# Patient Record
Sex: FEMALE | Race: WHITE | NOT HISPANIC OR LATINO | Employment: PART TIME | URBAN - METROPOLITAN AREA
[De-identification: names, ages, dates, MRNs, and addresses within clinical notes are randomized per-mention and may not be internally consistent; named-entity substitution may affect disease eponyms.]

---

## 2017-01-26 ENCOUNTER — ANESTHESIA EVENT (OUTPATIENT)
Dept: PERIOP | Facility: HOSPITAL | Age: 42
End: 2017-01-26
Payer: SELF-PAY

## 2017-01-27 ENCOUNTER — ANESTHESIA (OUTPATIENT)
Dept: PERIOP | Facility: HOSPITAL | Age: 42
End: 2017-01-27
Payer: SELF-PAY

## 2017-01-27 ENCOUNTER — HOSPITAL ENCOUNTER (OUTPATIENT)
Facility: HOSPITAL | Age: 42
Setting detail: OUTPATIENT SURGERY
Discharge: HOME | End: 2017-01-27
Attending: PLASTIC SURGERY | Admitting: PLASTIC SURGERY
Payer: SELF-PAY

## 2017-01-27 VITALS
HEIGHT: 65 IN | SYSTOLIC BLOOD PRESSURE: 105 MMHG | BODY MASS INDEX: 19.16 KG/M2 | OXYGEN SATURATION: 97 % | RESPIRATION RATE: 16 BRPM | DIASTOLIC BLOOD PRESSURE: 68 MMHG | HEART RATE: 53 BPM | WEIGHT: 115 LBS | TEMPERATURE: 98.6 F

## 2017-01-27 PROCEDURE — C1789 PROSTHESIS, BREAST, IMP: HCPCS | Performed by: PLASTIC SURGERY

## 2017-01-27 DEVICE — SMOOTH ROUND MODERATE PLUS PROFILE GEL-FILLED BREAST IMPLANT, 300CC  SMOOTH ROUND SILICONE
Type: IMPLANTABLE DEVICE | Site: BREAST | Status: FUNCTIONAL
Brand: MENTOR MEMORYGEL BREAST IMPLANT

## 2017-01-27 RX ORDER — SCOLOPAMINE TRANSDERMAL SYSTEM 1 MG/1
PATCH, EXTENDED RELEASE TRANSDERMAL AS NEEDED
Status: DISCONTINUED | OUTPATIENT
Start: 2017-01-27 | End: 2017-01-27 | Stop reason: SURG

## 2017-01-27 RX ORDER — LIDOCAINE HYDROCHLORIDE 10 MG/ML
INJECTION, SOLUTION INFILTRATION; PERINEURAL AS NEEDED
Status: DISCONTINUED | OUTPATIENT
Start: 2017-01-27 | End: 2017-01-27 | Stop reason: SURG

## 2017-01-27 RX ORDER — SODIUM CHLORIDE 9 MG/ML
125 INJECTION, SOLUTION INTRAVENOUS CONTINUOUS
Status: DISCONTINUED | OUTPATIENT
Start: 2017-01-27 | End: 2017-01-27 | Stop reason: HOSPADM

## 2017-01-27 RX ORDER — PROPOFOL 10 MG/ML
INJECTION, EMULSION INTRAVENOUS AS NEEDED
Status: DISCONTINUED | OUTPATIENT
Start: 2017-01-27 | End: 2017-01-27 | Stop reason: SURG

## 2017-01-27 RX ORDER — FENTANYL CITRATE 50 UG/ML
INJECTION, SOLUTION INTRAMUSCULAR; INTRAVENOUS AS NEEDED
Status: DISCONTINUED | OUTPATIENT
Start: 2017-01-27 | End: 2017-01-27 | Stop reason: SURG

## 2017-01-27 RX ORDER — MAGNESIUM HYDROXIDE 1200 MG/15ML
LIQUID ORAL AS NEEDED
Status: DISCONTINUED | OUTPATIENT
Start: 2017-01-27 | End: 2017-01-27 | Stop reason: HOSPADM

## 2017-01-27 RX ORDER — PROPOFOL 10 MG/ML
INJECTION, EMULSION INTRAVENOUS CONTINUOUS PRN
Status: DISCONTINUED | OUTPATIENT
Start: 2017-01-27 | End: 2017-01-27 | Stop reason: SURG

## 2017-01-27 RX ORDER — OXYCODONE HYDROCHLORIDE AND ACETAMINOPHEN 5; 325 MG/1; MG/1
2 TABLET ORAL EVERY 4 HOURS PRN
Status: DISCONTINUED | OUTPATIENT
Start: 2017-01-27 | End: 2017-01-27 | Stop reason: HOSPADM

## 2017-01-27 RX ORDER — FENTANYL CITRATE/PF 50 MCG/ML
50 SYRINGE (ML) INJECTION
Status: DISCONTINUED | OUTPATIENT
Start: 2017-01-27 | End: 2017-01-27 | Stop reason: HOSPADM

## 2017-01-27 RX ORDER — MIDAZOLAM HYDROCHLORIDE 1 MG/ML
INJECTION INTRAMUSCULAR; INTRAVENOUS AS NEEDED
Status: DISCONTINUED | OUTPATIENT
Start: 2017-01-27 | End: 2017-01-27 | Stop reason: SURG

## 2017-01-27 RX ORDER — ONDANSETRON 2 MG/ML
INJECTION INTRAMUSCULAR; INTRAVENOUS AS NEEDED
Status: DISCONTINUED | OUTPATIENT
Start: 2017-01-27 | End: 2017-01-27 | Stop reason: SURG

## 2017-01-27 RX ORDER — GLYCOPYRROLATE 0.2 MG/ML
INJECTION INTRAMUSCULAR; INTRAVENOUS AS NEEDED
Status: DISCONTINUED | OUTPATIENT
Start: 2017-01-27 | End: 2017-01-27 | Stop reason: SURG

## 2017-01-27 RX ORDER — ONDANSETRON 2 MG/ML
4 INJECTION INTRAMUSCULAR; INTRAVENOUS EVERY 8 HOURS PRN
Status: DISCONTINUED | OUTPATIENT
Start: 2017-01-27 | End: 2017-01-27 | Stop reason: HOSPADM

## 2017-01-27 RX ORDER — MEPERIDINE HYDROCHLORIDE 50 MG/ML
12.5 INJECTION INTRAMUSCULAR; INTRAVENOUS; SUBCUTANEOUS AS NEEDED
Status: DISCONTINUED | OUTPATIENT
Start: 2017-01-27 | End: 2017-01-27 | Stop reason: HOSPADM

## 2017-01-27 RX ORDER — ROCURONIUM BROMIDE 10 MG/ML
INJECTION, SOLUTION INTRAVENOUS AS NEEDED
Status: DISCONTINUED | OUTPATIENT
Start: 2017-01-27 | End: 2017-01-27 | Stop reason: SURG

## 2017-01-27 RX ORDER — BUPIVACAINE HYDROCHLORIDE AND EPINEPHRINE 2.5; 5 MG/ML; UG/ML
INJECTION, SOLUTION EPIDURAL; INFILTRATION; INTRACAUDAL; PERINEURAL AS NEEDED
Status: DISCONTINUED | OUTPATIENT
Start: 2017-01-27 | End: 2017-01-27 | Stop reason: HOSPADM

## 2017-01-27 RX ADMIN — ROCURONIUM BROMIDE 40 MG: 10 INJECTION, SOLUTION INTRAVENOUS at 08:43

## 2017-01-27 RX ADMIN — SODIUM CHLORIDE 125 ML/HR: 0.9 INJECTION, SOLUTION INTRAVENOUS at 08:00

## 2017-01-27 RX ADMIN — SODIUM CHLORIDE 125 ML/HR: 0.9 INJECTION, SOLUTION INTRAVENOUS at 11:39

## 2017-01-27 RX ADMIN — ONDANSETRON HYDROCHLORIDE 4 MG: 2 INJECTION, SOLUTION INTRAVENOUS at 08:36

## 2017-01-27 RX ADMIN — NEOSTIGMINE METHYLSULFATE 3 MG: 1 INJECTION INTRAMUSCULAR; INTRAVENOUS; SUBCUTANEOUS at 10:12

## 2017-01-27 RX ADMIN — PROPOFOL 100 MCG/KG/MIN: 10 INJECTION, EMULSION INTRAVENOUS at 08:46

## 2017-01-27 RX ADMIN — MIDAZOLAM HYDROCHLORIDE 2 MG: 1 INJECTION, SOLUTION INTRAMUSCULAR; INTRAVENOUS at 08:36

## 2017-01-27 RX ADMIN — HYDROMORPHONE HYDROCHLORIDE 0.5 MG: 1 INJECTION, SOLUTION INTRAMUSCULAR; INTRAVENOUS; SUBCUTANEOUS at 11:19

## 2017-01-27 RX ADMIN — REMIFENTANIL HYDROCHLORIDE 0.15 MCG/KG/MIN: 1 INJECTION, POWDER, LYOPHILIZED, FOR SOLUTION INTRAVENOUS at 08:46

## 2017-01-27 RX ADMIN — SODIUM CHLORIDE 125 ML/HR: 0.9 INJECTION, SOLUTION INTRAVENOUS at 14:00

## 2017-01-27 RX ADMIN — LIDOCAINE HYDROCHLORIDE 80 MG: 10 INJECTION, SOLUTION INFILTRATION; PERINEURAL at 08:43

## 2017-01-27 RX ADMIN — PROPOFOL 200 MG: 10 INJECTION, EMULSION INTRAVENOUS at 08:43

## 2017-01-27 RX ADMIN — CEFAZOLIN SODIUM 1000 MG: 1 SOLUTION INTRAVENOUS at 08:50

## 2017-01-27 RX ADMIN — FENTANYL CITRATE 50 MCG: 50 INJECTION, SOLUTION INTRAMUSCULAR; INTRAVENOUS at 09:03

## 2017-01-27 RX ADMIN — SCOPALAMINE 1 PATCH: 1 PATCH, EXTENDED RELEASE TRANSDERMAL at 08:34

## 2017-01-27 RX ADMIN — SODIUM CHLORIDE: 0.9 INJECTION, SOLUTION INTRAVENOUS at 09:03

## 2017-01-27 RX ADMIN — ROCURONIUM BROMIDE 10 MG: 10 INJECTION, SOLUTION INTRAVENOUS at 09:41

## 2017-01-27 RX ADMIN — GLYCOPYRROLATE 0.6 MG: 0.2 INJECTION, SOLUTION INTRAMUSCULAR; INTRAVENOUS at 10:12

## 2017-01-27 RX ADMIN — DEXAMETHASONE SODIUM PHOSPHATE 10 MG: 10 INJECTION INTRAMUSCULAR; INTRAVENOUS at 08:53

## 2017-01-27 RX ADMIN — FENTANYL CITRATE 50 MCG: 50 INJECTION, SOLUTION INTRAMUSCULAR; INTRAVENOUS at 08:42

## 2017-01-27 RX ADMIN — FENTANYL CITRATE 50 MCG: 50 INJECTION INTRAMUSCULAR; INTRAVENOUS at 10:34

## 2017-01-27 RX ADMIN — ROCURONIUM BROMIDE 10 MG: 10 INJECTION, SOLUTION INTRAVENOUS at 09:19

## 2017-01-27 RX ADMIN — ONDANSETRON 4 MG: 2 INJECTION INTRAMUSCULAR; INTRAVENOUS at 14:03

## 2017-01-27 RX ADMIN — OXYCODONE HYDROCHLORIDE AND ACETAMINOPHEN 1 TABLET: 5; 325 TABLET ORAL at 12:34

## 2017-01-27 RX ADMIN — HYDROMORPHONE HYDROCHLORIDE 0.5 MG: 1 INJECTION, SOLUTION INTRAMUSCULAR; INTRAVENOUS; SUBCUTANEOUS at 11:34

## 2017-01-27 RX ADMIN — FENTANYL CITRATE 50 MCG: 50 INJECTION INTRAMUSCULAR; INTRAVENOUS at 10:46

## 2017-01-27 RX ADMIN — ONDANSETRON HYDROCHLORIDE 4 MG: 2 INJECTION, SOLUTION INTRAVENOUS at 10:18

## 2017-08-07 ENCOUNTER — GENERIC CONVERSION - ENCOUNTER (OUTPATIENT)
Dept: OTHER | Facility: OTHER | Age: 42
End: 2017-08-07

## 2017-08-20 ENCOUNTER — OFFICE VISIT (OUTPATIENT)
Dept: URGENT CARE | Facility: MEDICAL CENTER | Age: 42
End: 2017-08-20
Payer: COMMERCIAL

## 2017-08-20 PROCEDURE — 99203 OFFICE O/P NEW LOW 30 MIN: CPT

## 2017-11-07 ENCOUNTER — ALLSCRIPTS OFFICE VISIT (OUTPATIENT)
Dept: OTHER | Facility: OTHER | Age: 42
End: 2017-11-07

## 2017-11-07 DIAGNOSIS — Z12.31 ENCOUNTER FOR SCREENING MAMMOGRAM FOR MALIGNANT NEOPLASM OF BREAST: ICD-10-CM

## 2017-11-08 NOTE — PROGRESS NOTES
Assessment  1  Dense breasts (793 82) (R92 2)   2  Irregular menstrual cycle (626 4) (N92 6)   3  Visit for screening mammogram (V76 12) (Z12 31)   4  Encounter for routine gynecological examination (V72 31) (Z01 419)    Plan  Encounter for routine gynecological examination    · Follow-up visit in 1 year Evaluation and Treatment  Follow-up  Status: Hold For -  Scheduling  Requested for: 65CHP2206   Ordered; For: Encounter for routine gynecological examination; Ordered By: Saunders Boeck Performed:  Due: 18NUC8408  Visit for screening mammogram    · MAMMO SCREENING BILATERAL W 3D & CAD; Status:Hold For - Scheduling; Requested  YJL:[id number];    Perform:[facility]; DRW:09HHL7800; Ordered; For:Visit for screening mammogram; Ordered By:Sami Keller; Discussion/Summary    1  Yearly exam-Pap smear deferred, self-breast awareness reviewed, calcium/vitamin-D recommendations discussed, mammogram request givenIrregular menses-patient with mostly monthly menses with occasional blood flow at 2-3 week intervals  She did have evaluation including sonohysterogram and endometrial biopsy December 2016 with negative findings  She will call with any persistent or worsening menometrorrhagia  Prior history of submucous myoma-patient was status post hysteroscopic myomectomy with perforation of the uterus with damage to the bladder  This was repaired laparoscopically with Dr Yves Castillo in July 2008  She denies any complaints from this  No recurrence was noted on sono histogram todayHistory of cervical dysplasia-Pap with HPV testing was negative November 2016  Pap smear was deferred today as per current guidelines with patient agreementHistory of right inguinal hernia-the patient is status post evaluation by Dr Marisela Werner  Surgery was never done  The patient could consider returning to see Dr Marisela Werner for evaluation of this condition and possible treatment should she continue with right-sided abdominal/pelvic pain  History of right pelvic painâresolved  She will call with any further symptomsIncreased breast densityânoted on most recent mammogram January 2016  Patient had ABUS done with negative findings    3D mammogram was recommended and patient agrees  Request was givenOther-patient status post breast enlargement procedure without problems  Good healing is noted  will follow-up in 1 year or as needed  The patient has the current Goals: Reviewed  The patent has the current Barriers: None  Patient is able to Self-Care  PATIENT EDUCATION RECORD She was given the following educational materials: Calcium/vitamin-D sheet   Chief Complaint  1  Visit For: Preventive General Multisystem Exam    History of Present Illness  HPI: Patient was seen today for yearly exam  Please see assessment plan for details  Review of Systems    Constitutional: No fever, no chills, feels well, no tiredness, no recent weight gain or loss  ENT: no ear ache, no loss of hearing, no nosebleeds or nasal discharge, no sore throat or hoarseness  Cardiovascular: no complaints of slow or fast heart rate, no chest pain, no palpitations, no leg claudication or lower extremity edema  Respiratory: no complaints of shortness of breath, no wheezing, no dyspnea on exertion, no orthopnea or PND  Breasts: no complaints of breast pain, breast lump or nipple discharge  Gastrointestinal: no complaints of abdominal pain, no constipation, no nausea or diarrhea, no vomiting, no bloody stools  Genitourinary: unexplained vaginal bleeding, but-- as noted in HPI  Musculoskeletal: no complaints of arthralgia, no myalgia, no joint swelling or stiffness, no limb pain or swelling  Integumentary: no complaints of skin rash or lesion, no itching or dry skin, no skin wounds  Neurological: no complaints of headache, no confusion, no numbness or tingling, no dizziness or fainting  ROS reviewed  Active Problems  1   Breast self examination education, encounter for (V65 49) (Z71 89)   2  Cervical cancer screening (V76 2) (Z12 4)   3  Dense breasts (793 82) (R92 2)   4  Encounter for routine gynecological examination with Papanicolaou smear of cervix   (V72 31,V76 2) (Z01 419)   5  Irregular menstrual cycle (626 4) (N92 6)   6  Lower back pain (724 2) (M54 5)   7  Screening for HPV (human papillomavirus) (V73 81) (Z11 51)   8  Strain of lumbar region, initial encounter (847 2) (S39 012A)   9  Visit for screening mammogram (V76 12) (Z12 31)    Past Medical History   · Breast self examination education, encounter for (V65 49) (Z71 89)   · History of Closed Fracture Of L5 Vertebral Body (805 4)   · History of Endometrial polyp (621 0) (N84 0)   · History of mastitis (V13 89) (I42 490)   · History of shortness of breath (V13 89) (W64 958)   · History of uterine leiomyoma (V13 29) (Z86 018)   · History of Inguinal hernia (550 90) (K40 90)   · History of Knee Pain Elicited By Motion   · History of Mild cervical dysplasia (622 11) (N87 0)   · History of Missed  (632) (O02 1)   · History of Multiple abrasions (919 0) (T07  Darline Rule)   · History of Pelvic and perineal pain (625 9) (R10 2)   · History of Summary Of Previous Pregnancies  3  (Total No )   · History of Summary Of Previous Pregnancies Para 2  (Deliveries)    The active problems and past medical history were reviewed and updated today  Surgical History   · History of Biopsy Breast Open   · History of Breast Surgery Enlargement Procedure   · History of  Section Low Transverse   · History of Colposcopy Cervix With Biopsy(S) With Endocervical Curettage   · History of Dilation And Curettage   · History of Hysteroscopy With Removal Of Submucous Leiomyomata   · History of Laparoscopy Myomectomy   · History of Surgically Induced    · History of Tubal Ligation    The surgical history was reviewed and updated today         Family History  Mother    · No pertinent family history  Father    · Family history of cardiac disorder (V17 49) (Z82 49)  Maternal Grandmother    · Family history of Colon Cancer (V16 0)   · Family history of Heart Disease (V17 49)   · Family history of Hypertension (V17 49)  Paternal Grandmother    · Family history of Heart Disease (V17 49)  Maternal Grandfather    · Family history of Lung Cancer (V16 1)  Paternal Grandfather    · Family history of Heart Disease (V17 49)    The family history was reviewed and updated today  Social History   · Being A Social Drinker   · Caffeine Use   · Denied: History of Drug Use   · Marital History - Currently    · Never A Smoker   · Taoism Affiliation Daksha Salguero Vary 17)   · Tubal ligations (V26 51) (Z98 51)   · Two children   · Uses Safety Equipment - Seatbelts  The social history was reviewed and updated today  The social history was reviewed and is unchanged  Current Meds   1  Multi-Vitamin Daily Oral Tablet; Therapy: (Recorded:27Umv9257) to Recorded    Allergies  1  Sulfa Drugs   2  Keflex CAPS    Vitals   Recorded: 54EXQ0182 00:80YB   Systolic 067, LUE, Sitting   Diastolic 68, LUE, Sitting   Height 5 ft 5 in   Weight 114 lb 6 oz   BMI Calculated 19 03   BSA Calculated 1 56   LMP 19Oct2017     Physical Exam    Constitutional   General appearance: No acute distress, well appearing and well nourished  Neck   Neck: Normal, supple, trachea midline, no masses  Thyroid: Normal, no thyromegaly  Genitourinary   External genitalia: Normal and no lesions appreciated  Vagina: Normal, no lesions or dryness appreciated  Urethra: Normal     Urethral meatus: Normal     Bladder: Normal, soft, non-tender and no prolapse or masses appreciated  Cervix: Normal, no palpable masses  -- Anteverted, top normal size, nontender, without masses  Adnexa/parametria: Normal, non-tender and no fullness or masses appreciated  Anus, perineum, and rectum: Normal sphincter tone, no masses, and no prolapse      Chest   Breasts: Normal and no dimpling or skin changes noted  -- Symmetric breast implants noted  Abdomen   Abdomen: Normal, non-tender, and no organomegaly noted  Liver and spleen: No hepatomegaly or splenomegaly  Examination for hernias: No hernias appreciated  Lymphatic   Palpation of lymph nodes in neck, axillae, groin and/or other locations: No lymphadenopathy or masses noted  Skin   Skin and subcutaneous tissue: Normal skin turgor and no rashes      Palpation of skin and subcutaneous tissue: Normal     Psychiatric   Orientation to person, place, and time: Normal     Mood and affect: Normal        Signatures   Electronically signed by : CLARENCE Dumont ; Nov 7 2017  2:11PM EST                       (Author)

## 2018-01-02 ENCOUNTER — HOSPITAL ENCOUNTER (OUTPATIENT)
Dept: MAMMOGRAPHY | Facility: MEDICAL CENTER | Age: 43
Discharge: HOME | End: 2018-01-02
Payer: COMMERCIAL

## 2018-01-02 DIAGNOSIS — Z12.31 ENCOUNTER FOR SCREENING MAMMOGRAM FOR MALIGNANT NEOPLASM OF BREAST: ICD-10-CM

## 2018-01-02 PROCEDURE — 77063 BREAST TOMOSYNTHESIS BI: CPT

## 2018-01-02 PROCEDURE — 77067 SCR MAMMO BI INCL CAD: CPT

## 2018-01-03 ENCOUNTER — GENERIC CONVERSION - ENCOUNTER (OUTPATIENT)
Dept: OTHER | Facility: OTHER | Age: 43
End: 2018-01-03

## 2018-01-03 DIAGNOSIS — R92.2 INCONCLUSIVE MAMMOGRAM: ICD-10-CM

## 2018-01-12 NOTE — MISCELLANEOUS
Message   Recorded as Task   Date: 12/13/2016 08:25 AM, Created By: Eugene Fox   Task Name: Go to Result   Assigned To: Lillian Myrick   Regarding Patient: Astrid Delcid, Status: In Progress   Comment:    Ace Keller - 13 Dec 2016 8:25 AM     TASK CREATED  Endometrial biopsy is benign, please inform the patient  Recommend she call with continued menometrorrhagia  Otherwise, follow up with yearly exams   Anupama Cortez - 13 Dec 2016 9:16 AM     TASK IN PROGRESS   Pt informed  Active Problems    1  Breast self examination education, encounter for (V65 49) (Z71 89)   2  Cervical cancer screening (V76 2) (Z12 4)   3  Dense breasts (793 82) (R92 2)   4  Encounter for routine gynecological examination with Papanicolaou smear of cervix   (V72 31,V76 2) (Z01 419)   5  Irregular menstrual cycle (626 4) (N92 6)   6  Pelvic and perineal pain (625 9) (R10 2)   7  Screening for HPV (human papillomavirus) (V73 81) (Z11 51)   8  Visit for screening mammogram (V76 12) (Z12 31)    Current Meds   1  Multi-Vitamin Daily Oral Tablet; Therapy: (Recorded:01Ajw9574) to Recorded    Allergies    1   Sulfa Drugs    Signatures   Electronically signed by : Kimberlyn Dunne, ; Dec 13 2016  9:32AM EST                       (Author)

## 2018-01-13 NOTE — MISCELLANEOUS
Message   Date: 30 Mar 2016 10:12 AM EST, Recorded By: Tima Velarde For: Jamir Mccray: Anand Ulloa, Self   Phone: [phone] (Home), [phone] (Work)   Reason: Other   ABUS precerted-- no precert needed per Mima Mccoy at [facility] 150    sent regional breast the authorization           Active Problems    1  Abdominal pain, RLQ (right lower quadrant) (789 03) (R10 31)   2  Acute upper respiratory infection (465 9) (J06 9)   3  Breast cancer screening (V76 10) (Z12 39)   4  Breast self examination education, encounter for (V65 49) (Z71 89)   5  Dense breasts (793 82) (R92 2)   6  Encounter for screening for malignant neoplasm of cervix (V76 2) (Z12 4)   7  Hand Pain Elicited By Motion   8  Irregular menstrual cycle (626 4) (N92 6)   9  Knee Pain Elicited By Motion   10  Laboratory examination ordered as part of a routine general medical examination    (V72 62) (Z00 00)   11  Leiomyoma of uterus (218 9) (D25 9)   12  Multiple abrasions (919 0) (T14 8)   13  Pelvic and perineal pain (625 9) (R10 2)   14  Routine Gynecological Exam With Cervical Pap Smear (V72 31)   15  Shortness of breath (786 05) (R06 02)   16  Submucous leiomyoma of uterus (218 0) (D25 0)    Current Meds   1  Azithromycin 250 MG Oral Tablet; TAKE 2 TABLETS ON DAY 1 THEN TAKE 1 TABLET A   DAY FOR 4 DAYS; Therapy: 05ZJK8725 to (Last Rx:32Ihl3576) Ordered   2  Multi-Vitamin Daily Oral Tablet; Therapy: (Recorded:59Mst0827) to Recorded    Allergies    1   Sulfa Drugs    Signatures   Electronically signed by : Shanti Jones, ; Mar 30 2016 10:13AM EST                       (Author)

## 2018-01-14 VITALS
WEIGHT: 114.38 LBS | HEIGHT: 65 IN | SYSTOLIC BLOOD PRESSURE: 114 MMHG | DIASTOLIC BLOOD PRESSURE: 68 MMHG | BODY MASS INDEX: 19.06 KG/M2

## 2018-01-15 NOTE — MISCELLANEOUS
Assessment    1  Abscess or cellulitis of leg (682 6) (L02 419,L03 119)   2  Contact dermatitis (692 9) (L25 9)    Plan  Contact dermatitis    · Follow Up if Not Better Evaluation and Treatment  Follow-up  Status: Complete  Done:  31AZW6780 02:45PM   Ordered; For: Contact dermatitis; Ordered By: Janelle Acosta Performed:  Due: 20YMH2645    Discussion/Summary  Discussion Summary:   Patient will complete prednisone as well as anabiotic as directed  Patient will follow-up as needed  Chief Complaint  Chief Complaint Free Text Note Form: GERALD APPT  Patient was admitted to [facility] 7/25/16 for Contact Dermatitis due to plan, Cellulitis of L thigh and skin  She was discharged to home 7/26/16  She notes that the Cellulitis is still spreading and the poison is very itchy  History of Present Illness  TCM Communication [facility]: The patient is being contacted for follow-up after hospitalization and GERALD APPT 07/27/2016 AT 02:15 PM  Hospital course was discussed with the inpatient physician, records were reviewed and  [address]  She was hospitalized at [facility]  The date of discharge: 07/26/2016  Diagnosis: ADMITTING DIAGNOSIS: CONTACT DERMATITIS DUE TO PLANT, CELLULITIS OF LEFT THIGH & CELLULITIS OF SKIN  She was discharged to home, DISCHARGE DIAGNOSIS: CONTACT DERMATITIS SECONDARY TO POISON [place] 49  Medications reviewed and updated today  She scheduled a follow up appointment     Symptoms: dizziness, rash:, swelling and swelling location LEFT LEG UPPER THIGH , but no fever, no weakness, no headache, no fatigue, no cough, no shortness of breath, no chest pain, no back pain on left side, no back pain on right side, no arm pain left side, no arm pain on right side, no leg pain on left side, no leg pain on right side, no upper abdominal pain, no middle abdominal pain, no lower abdominal pain, no anorexia, no nausea, no vomiting, no loose stools, no constipation and no pain with urinating  PT FEELS HER DIZZINESS IS FROM ALL THE MEDS SHE IS ON  Counseling was provided to the patient  Topics counseled included diagnostic results, instructions for management, activities of daily living and importance of compliance with treatment  Communication performed and completed by Doc Durham   HPI: Patient is here status post hospitalization from July 25 through July 26 for cellulitis left lower extremity  Patient had fever and redness and pain which has resolved  Patient also with contact dermatitis secondary to poison ivy  Patient on prednisone taper along with Keflex and doxycycline  Patient had normal CBC except with blood cell count 11 29  Review of Systems  Complete-Female:   Constitutional: No fever, no chills, feels well, no tiredness, no recent weight gain or weight loss  Eyes: No complaints of eye pain, no red eyes, no eyesight problems, no discharge, no dry eyes, no itching of eyes  ENT: no complaints of earache, no loss of hearing, no nose bleeds, no nasal discharge, no sore throat, no hoarseness  Cardiovascular: No complaints of slow heart rate, no fast heart rate, no chest pain, no palpitations, no leg claudication, no lower extremity edema  Respiratory: No complaints of shortness of breath, no wheezing, no cough, no SOB on exertion, no orthopnea, no PND  Gastrointestinal: No complaints of abdominal pain, no constipation, no nausea or vomiting, no diarrhea, no bloody stools  Genitourinary: No complaints of dysuria, no incontinence, no pelvic pain, no dysmenorrhea, no vaginal discharge or bleeding  Musculoskeletal: No complaints of arthralgias, no myalgias, no joint swelling or stiffness, no limb pain or swelling  Integumentary: as noted in HPI  Neurological: No complaints of headache, no confusion, no convulsions, no numbness, no dizziness or fainting, no tingling, no limb weakness, no difficulty walking     Psychiatric: Not suicidal, no sleep disturbance, no anxiety or depression, no change in personality, no emotional problems  Endocrine: No complaints of proptosis, no hot flashes, no muscle weakness, no deepening of the voice, no feelings of weakness  Hematologic/Lymphatic: No complaints of swollen glands, no swollen glands in the neck, does not bleed easily, does not bruise easily  Active Problems    1  Abdominal pain, RLQ (right lower quadrant) (789 03) (R10 31)   2  Acute upper respiratory infection (465 9) (J06 9)   3  Breast cancer screening (V76 10) (Z12 39)   4  Breast self examination education, encounter for (V65 49) (Z71 89)   5  Dense breasts (793 82) (R92 2)   6  Encounter for screening for malignant neoplasm of cervix (V76 2) (Z12 4)   7  Hand Pain Elicited By Motion   8  Irregular menstrual cycle (626 4) (N92 6)   9  Knee Pain Elicited By Motion   10  Laboratory examination ordered as part of a routine general medical examination    (V72 62) (Z00 00)   11  Leiomyoma of uterus (218 9) (D25 9)   12  Multiple abrasions (919 0) (T14 8)   13  Pelvic and perineal pain (625 9) (R10 2)   14  Routine Gynecological Exam With Cervical Pap Smear (V72 31)   15  Shortness of breath (786 05) (R06 02)   16  Submucous leiomyoma of uterus (218 0) (D25 0)    Past Medical History    1  Breast self examination education, encounter for (V65 49) (Z71 89)   2  History of Closed Fracture Of L5 Vertebral Body (805 4)   3  History of Endometrial polyp (621 0) (N84 0)   4  History of mastitis (V13 89) (Z87 898)   5  History of Inguinal hernia (550 90) (K40 90)   6  History of Mild cervical dysplasia (622 11) (N87 0)   7  History of Missed  (632) (O02 1)   8  History of Summary Of Previous Pregnancies  3  (Total No )   9  History of Summary Of Previous Pregnancies Para 2  (Deliveries)    Surgical History    1  History of Biopsy Breast Open   2  History of  Section Low Transverse   3   History of Colposcopy Cervix With Biopsy(S) With Endocervical Curettage   4  History of Dilation And Curettage   5  History of Hysteroscopy With Removal Of Submucous Leiomyomata   6  History of Laparoscopy Myomectomy   7  History of Surgically Induced    8  History of Tubal Ligation    Family History  Mother    1  No pertinent family history  Father    2  Family history of cardiac disorder (V17 49) (Z82 49)  Maternal Grandmother    3  Family history of Colon Cancer (V16 0)   4  Family history of Heart Disease (V17 49)   5  Family history of Hypertension (V17 49)  Paternal Grandmother    10  Family history of Heart Disease (V17 49)  Maternal Grandfather    7  Family history of Lung Cancer (V16 1)  Paternal Grandfather    6  Family history of Heart Disease (V17 49)    Social History    · Being A Social Drinker   · Caffeine Use   · Denied: History of Drug Use   · Marital History - Currently    · Never A Smoker   · Bahai Affiliation Daksha Salguero Vary 17)   · Tubal ligations (V26 51) (Z98 51)   · Two children   · Uses Safety Equipment - Seatbelts    Current Meds   1  Azithromycin 250 MG Oral Tablet; TAKE 2 TABLETS ON DAY 1 THEN TAKE 1 TABLET A   DAY FOR 4 DAYS; Therapy: 64DEY8688 to (Last Rx:79Clx3836) Ordered   2  Multi-Vitamin Daily Oral Tablet; Therapy: (Recorded:53Pim7023) to Recorded    Allergies    1  Sulfa Drugs    Vitals  Signs   Recorded: 38Kwa9512 02:32PM   Height: 5 ft 5 in  Weight: 113 lb 6 oz  BMI Calculated: 18 87  BSA Calculated: 1 55    Physical Exam    Constitutional   General appearance: No acute distress, well appearing and well nourished  Eyes   Conjunctiva and lids: No swelling, erythema or discharge  Pupils and irises: Equal, round and reactive to light  Ears, Nose, Mouth, and Throat   External inspection of ears and nose: Normal     Otoscopic examination: Tympanic membranes translucent with normal light reflex  Canals patent without erythema      Nasal mucosa, septum, and turbinates: Normal without edema or erythema  Oropharynx: Normal with no erythema, edema, exudate or lesions  Pulmonary   Respiratory effort: No increased work of breathing or signs of respiratory distress  Auscultation of lungs: Clear to auscultation  Cardiovascular   Palpation of heart: Normal PMI, no thrills  Auscultation of heart: Normal rate and rhythm, normal S1 and S2, without murmurs  Examination of extremities for edema and/or varicosities: Normal     Carotid pulses: Normal     Abdomen   Abdomen: Non-tender, no masses  Liver and spleen: No hepatomegaly or splenomegaly  Lymphatic   Palpation of lymph nodes in neck: No lymphadenopathy  Musculoskeletal   Gait and station: Normal     Digits and nails: Normal without clubbing or cyanosis  Inspection/palpation of joints, bones, and muscles: Normal     Skin   Skin and subcutaneous tissue: Abnormal   Contact dermatitis consistent with poison on chin/abdomen/extremities  Warmth and mild erythema left thigh  Neurologic   Cranial nerves: Cranial nerves 2-12 intact  Reflexes: 2+ and symmetric  Sensation: No sensory loss      Psychiatric   Orientation to person, place, and time: Normal     Mood and affect: Normal          Signatures   Electronically signed by : Renee Scherer, ; Jul 27 2016 10:01AM EST                       (Co-author)    Electronically signed by : Joy Rachel DO; Jul 27 2016  2:44PM EST                       (Author)

## 2018-01-16 NOTE — MISCELLANEOUS
Message   Recorded as Task   Date: 01/18/2016 10:20 AM, Created By: Shannen Hill   Task Name: Go to Result   Assigned To: Manny Fragoso   Regarding Patient: Eric Villatoro, Status: In Progress   Comment:    Marcellus Baltazar - 18 Jan 2016 10:20 AM     TASK CREATED  pt needs ABUS due toe treme breat density   Anupama Cortez - 19 Jan 2016 1:49 PM     TASK IN PROGRESS    Pt informed, slip and letter mailed  Active Problems    1  Abdominal pain, RLQ (right lower quadrant) (789 03) (R10 31)   2  Breast cancer screening (V76 10) (Z12 39)   3  Breast self examination education, encounter for (V65 49) (Z71 89)   4  Dense breasts (793 82) (R92 2)   5  Encounter for screening for malignant neoplasm of cervix (V76 2) (Z12 4)   6  Hand Pain Elicited By Motion   7  Irregular menstrual cycle (626 4) (N92 6)   8  Knee Pain Elicited By Motion   9  Laboratory examination ordered as part of a routine general medical examination   (V72 62) (Z00 00)   10  Leiomyoma of uterus (218 9) (D25 9)   11  Multiple abrasions (919 0) (T14 8)   12  Pelvic and perineal pain (625 9) (R10 2)   13  Routine Gynecological Exam With Cervical Pap Smear (V72 31)   14  Shortness of breath (786 05) (R06 02)   15  Submucous leiomyoma of uterus (218 0) (D25 0)    Current Meds   1  Multi-Vitamin Daily Oral Tablet; Therapy: (Recorded:90Xzz7246) to Recorded    Allergies    1  Sulfa Drugs    Plan  Dense breasts    · US BREAST BILATERAL ABUS; Status:Hold For - Scheduling,Retrospective  Authorization;  Requested AER:[id number];     Signatures   Electronically signed by : Ermias Perdomo, ; Jan 19 2016  1:51PM EST                       (Author)

## 2018-01-23 NOTE — MISCELLANEOUS
Message   Recorded as Task   Date: 01/02/2018 07:11 PM, Created By: Grazyna Mccray   Task Name: Miscellaneous   Assigned To: Fariba Lennon   Regarding Patient: Jayesh Christian, Status: Active   CommentStejamal Lundberg - 02 Jan 2018 7:11 PM     TASK CREATED  Mammogram within normal limits with increased breast density noted  Could consider automated breast ultrasound soon and/or 3D mammogram in 1 year's time  Lashay Mackenzie - 03 Jan 2018 8:08 AM     TASK EDITED  letter and script mailed to pt        Active Problems    1  Breast self examination education, encounter for (V65 49) (Z71 89)   2  Cervical cancer screening (V76 2) (Z12 4)   3  Dense breasts (793 82) (R92 2)   4  Encounter for routine gynecological examination (V72 31) (Z01 419)   5  Encounter for routine gynecological examination with Papanicolaou smear of cervix   (V72 31,V76 2) (Z01 419)   6  Irregular menstrual cycle (626 4) (N92 6)   7  Lower back pain (724 2) (M54 5)   8  Screening for HPV (human papillomavirus) (V73 81) (Z11 51)   9  Strain of lumbar region, initial encounter (847 2) (S39 012A)   10  Visit for screening mammogram (V76 12) (Z12 31)    Current Meds   1  Multi-Vitamin Daily Oral Tablet; Therapy: (Recorded:26Amc0526) to Recorded    Allergies    1  Sulfa Drugs   2  Keflex CAPS    Plan  Dense breasts    · US BREAST SCREENING BILATERAL COMPLETE (ABUS); Status:Hold For -  Prepared Response;  Requested VOR:[id number];     Signatures   Electronically signed by : Lea Sommer, ; Pedro Luis  3 2018  8:08AM EST                       (Author)

## 2018-01-26 ENCOUNTER — OFFICE VISIT (OUTPATIENT)
Dept: FAMILY MEDICINE CLINIC | Facility: CLINIC | Age: 43
End: 2018-01-26
Payer: COMMERCIAL

## 2018-01-26 VITALS
WEIGHT: 116.2 LBS | DIASTOLIC BLOOD PRESSURE: 72 MMHG | SYSTOLIC BLOOD PRESSURE: 120 MMHG | TEMPERATURE: 98.2 F | BODY MASS INDEX: 19.84 KG/M2 | HEIGHT: 64 IN

## 2018-01-26 DIAGNOSIS — Z23 NEED FOR INFLUENZA VACCINATION: Primary | ICD-10-CM

## 2018-01-26 DIAGNOSIS — F41.9 ANXIETY: ICD-10-CM

## 2018-01-26 DIAGNOSIS — Z23 NEED FOR TDAP VACCINATION: ICD-10-CM

## 2018-01-26 PROCEDURE — 99213 OFFICE O/P EST LOW 20 MIN: CPT | Performed by: FAMILY MEDICINE

## 2018-01-26 RX ORDER — ALPRAZOLAM 0.25 MG/1
0.25 TABLET ORAL 2 TIMES DAILY PRN
Qty: 30 TABLET | Refills: 0 | Status: SHIPPED | OUTPATIENT
Start: 2018-01-26

## 2018-01-26 NOTE — PROGRESS NOTES
Assessment/Plan:    No problem-specific Assessment & Plan notes found for this encounter  patient will try to continue with exercise, relaxation techniques, meditation  patient will use Xanax as needed for panic attacks  Other guidance given  Diagnoses and all orders for this visit:    Need for influenza vaccination  -     Flu vaccine greater than or equal to 4yo preservative free IM    Need for Tdap vaccination  -     Tdap vaccine greater than or equal to 6yo IM    Anxiety  -     ALPRAZolam (XANAX) 0 25 mg tablet; Take 1 tablet (0 25 mg total) by mouth 2 (two) times a day as needed for anxiety          Subjective:      Patient ID: Solomon Hollis is a 36 y o  female  Patient is here under increased stress over the past year  Patient worsening overall  Patient starting with panic attacks over the past week  Patient feels that her throat tightness and she can't breathe with increased stress and anxiety  The patient also with some chest discomfort associated with this  Patient stresses include going through divorce, work  Patient also with decreased focus and concentration  no homicidal or suicidal ideation  Anxiety   Symptoms include decreased concentration and nervous/anxious behavior  Patient reports no confusion or suicidal ideas  The following portions of the patient's history were reviewed and updated as appropriate: allergies, current medications, past family history, past medical history, past social history, past surgical history and problem list     Review of Systems   Constitutional: Negative  HENT: Negative  Eyes: Negative  Respiratory: Negative  Cardiovascular: Negative  Gastrointestinal: Negative  Endocrine: Negative  Genitourinary: Negative  Musculoskeletal: Negative  Skin: Negative  Allergic/Immunologic: Negative  Neurological: Negative  Hematological: Negative      Psychiatric/Behavioral: Positive for decreased concentration and sleep disturbance  Negative for confusion, hallucinations, self-injury and suicidal ideas  The patient is nervous/anxious  Objective:     Physical Exam   Constitutional: She is oriented to person, place, and time  She appears well-developed and well-nourished  No distress  HENT:   Head: Normocephalic  Right Ear: External ear normal    Left Ear: External ear normal    Mouth/Throat: Oropharynx is clear and moist  No oropharyngeal exudate  Eyes: EOM are normal  Pupils are equal, round, and reactive to light  Right eye exhibits no discharge  Left eye exhibits no discharge  No scleral icterus  Neck: Normal range of motion  Neck supple  No thyromegaly present  Cardiovascular: Normal rate, regular rhythm, normal heart sounds and intact distal pulses  Exam reveals no gallop and no friction rub  No murmur heard  Pulmonary/Chest: Effort normal and breath sounds normal  No respiratory distress  She has no wheezes  She has no rales  She exhibits no tenderness  Abdominal: Soft  Bowel sounds are normal  She exhibits no distension  There is no tenderness  There is no rebound and no guarding  Musculoskeletal: Normal range of motion  She exhibits no edema or tenderness  Lymphadenopathy:     She has no cervical adenopathy  Neurological: She is oriented to person, place, and time  No cranial nerve deficit  She exhibits normal muscle tone  Coordination normal    Skin: Skin is warm and dry  No rash noted  She is not diaphoretic  No erythema  No pallor     Psychiatric: Her behavior is normal  Judgment and thought content normal      Anxiety noted regarding mood

## 2018-01-31 ENCOUNTER — TELEPHONE (OUTPATIENT)
Dept: OTHER | Facility: OTHER | Age: 43
End: 2018-01-31

## 2018-02-27 ENCOUNTER — OFFICE VISIT (OUTPATIENT)
Dept: FAMILY MEDICINE CLINIC | Facility: CLINIC | Age: 43
End: 2018-02-27
Payer: COMMERCIAL

## 2018-02-27 VITALS
TEMPERATURE: 99.5 F | SYSTOLIC BLOOD PRESSURE: 110 MMHG | WEIGHT: 115 LBS | BODY MASS INDEX: 19.16 KG/M2 | HEIGHT: 65 IN | DIASTOLIC BLOOD PRESSURE: 68 MMHG

## 2018-02-27 DIAGNOSIS — F41.9 ANXIETY: Primary | ICD-10-CM

## 2018-02-27 PROCEDURE — 3008F BODY MASS INDEX DOCD: CPT | Performed by: FAMILY MEDICINE

## 2018-02-27 PROCEDURE — 1036F TOBACCO NON-USER: CPT | Performed by: FAMILY MEDICINE

## 2018-02-27 PROCEDURE — 99213 OFFICE O/P EST LOW 20 MIN: CPT | Performed by: FAMILY MEDICINE

## 2018-02-27 NOTE — PROGRESS NOTES
Assessment/Plan:   patient will only use Xanax as needed  Guidance given  Stressor is  he will be leaving the house permanently as of tomorrow  No problem-specific Assessment & Plan notes found for this encounter  Diagnoses and all orders for this visit:    Anxiety          Subjective:      Patient ID: Mary Jones is a 36 y o  female  Patient is here to follow-up on anxiety and panic attacks  Patient is use for Xanax only since last being seen  Patient feels as though it does not reduce her stress significantly  Patient not having panic attacks but still having anxiety  Patient going to do worse at the present time   will be out of house tomorrow night  The following portions of the patient's history were reviewed and updated as appropriate: allergies, current medications, past family history, past medical history, past surgical history and problem list     Review of Systems   Constitutional: Negative  HENT: Negative  Eyes: Negative  Respiratory: Negative  Cardiovascular: Negative  Gastrointestinal: Negative  Endocrine: Negative  Genitourinary: Negative  Musculoskeletal: Negative  Skin: Negative  Allergic/Immunologic: Negative  Neurological: Negative  Hematological: Negative  Psychiatric/Behavioral: The patient is nervous/anxious  Objective:      /68 (BP Location: Right arm, Patient Position: Sitting, Cuff Size: Standard)   Temp 99 5 °F (37 5 °C)   Ht 5' 4 5" (1 638 m)   Wt 52 2 kg (115 lb)   LMP 02/06/2018   Breastfeeding? No   BMI 19 43 kg/m²          Physical Exam   Constitutional: She is oriented to person, place, and time  She appears well-developed and well-nourished  No distress  HENT:   Head: Normocephalic  Right Ear: External ear normal    Left Ear: External ear normal    Mouth/Throat: Oropharynx is clear and moist  No oropharyngeal exudate     Eyes: EOM are normal  Pupils are equal, round, and reactive to light  Right eye exhibits no discharge  Left eye exhibits no discharge  No scleral icterus  Neck: Normal range of motion  Neck supple  No thyromegaly present  Cardiovascular: Normal rate, regular rhythm, normal heart sounds and intact distal pulses  Exam reveals no gallop and no friction rub  No murmur heard  Pulmonary/Chest: Effort normal and breath sounds normal  No respiratory distress  She has no wheezes  She has no rales  She exhibits no tenderness  Abdominal: Soft  Bowel sounds are normal  She exhibits no distension  There is no tenderness  There is no rebound and no guarding  Musculoskeletal: Normal range of motion  She exhibits no edema or tenderness  Lymphadenopathy:     She has no cervical adenopathy  Neurological: She is oriented to person, place, and time  No cranial nerve deficit  She exhibits normal muscle tone  Coordination normal    Skin: Skin is warm and dry  No rash noted  She is not diaphoretic  No erythema  No pallor  Psychiatric: Her behavior is normal  Judgment and thought content normal    Nursing note and vitals reviewed

## 2018-07-02 ENCOUNTER — TELEPHONE (OUTPATIENT)
Dept: FAMILY MEDICINE CLINIC | Facility: CLINIC | Age: 43
End: 2018-07-02

## 2018-07-02 NOTE — TELEPHONE ENCOUNTER
PT LEFT A VOICE MESSAGE  SHE IS GOING TO HAVE TO TRANSFER TO A [facility] PHYSICIAN DUE TO INSURANCE  SHE WANTS TO KNOW IF YOU HAVE ANY RECOMMENDATIONS? SHE ALSO ASKED IF WE HAVE HER IMMUNIZATION RECORDS, SO PLEASE LET HER KNOW WHEN WE CALL HER BACK THAT WE DO NOT

## 2018-07-10 ENCOUNTER — TELEPHONE (OUTPATIENT)
Dept: FAMILY MEDICINE CLINIC | Facility: CLINIC | Age: 43
End: 2018-07-10

## 2018-07-10 NOTE — TELEPHONE ENCOUNTER
Called patient to advise patient that we have no record of her HEP B  recommend that she follow up with previous PCP records or have Blood work done

## 2021-04-15 DIAGNOSIS — Z23 ENCOUNTER FOR IMMUNIZATION: ICD-10-CM

## (undated) DEVICE — 3M™ STERI-STRIP™ REINFORCED ADHESIVE SKIN CLOSURES, R1547, 1/2 IN X 4 IN (12 MM X 100 MM), 6 STRIPS/ENVELOPE: Brand: 3M™ STERI-STRIP™

## (undated) DEVICE — KERLIX BANDAGE ROLL: Brand: KERLIX

## (undated) DEVICE — INTENDED FOR TISSUE SEPARATION, AND OTHER PROCEDURES THAT REQUIRE A SHARP SURGICAL BLADE TO PUNCTURE OR CUT.: Brand: BARD-PARKER ® CARBON RIB-BACK BLADES

## (undated) DEVICE — FUNNEL E KELLER 2 DELIVERY DEVICE

## (undated) DEVICE — GLOVE SRG BIOGEL 6.5

## (undated) DEVICE — STRL UNIVERSAL LAPAROTOMY PACK: Brand: CARDINAL HEALTH

## (undated) DEVICE — UNDYED MONOFILAMENT (POLYDIOXANONE), ABSORBABLE SURGICAL SUTURE: Brand: PDS

## (undated) DEVICE — TOWEL SET X-RAY

## (undated) DEVICE — SYRINGE 20ML LL

## (undated) DEVICE — MEDI-VAC YANKAUER SUCTION HANDLE W/BULBOUS AND CONTROL VENT: Brand: CARDINAL HEALTH

## (undated) DEVICE — 3M™ STERI-STRIP™ COMPOUND BENZOIN TINCTURE 40 BAGS/CARTON 4 CARTONS/CASE C1544: Brand: 3M™ STERI-STRIP™

## (undated) DEVICE — SCD SEQUENTIAL COMPRESSION COMFORT SLEEVE MEDIUM KNEE LENGTH: Brand: KENDALL SCD

## (undated) DEVICE — REM POLYHESIVE ADULT PATIENT RETURN ELECTRODE: Brand: VALLEYLAB

## (undated) DEVICE — INSULATED BLADE ELECTRODE;CAUTION: FOR MANUFACTURING, PROCESSING, OR REPACKING.: Brand: EDGE

## (undated) DEVICE — DRAPE TOWEL: Brand: CONVERTORS

## (undated) DEVICE — SUT PDS II 2-0 SH 27 IN Z317H

## (undated) DEVICE — GLOVE INDICATOR PI UNDERGLOVE SZ 7.5 BLUE

## (undated) DEVICE — BULB SYRINGE,IRRIGATION WITH PROTECTIVE CAP: Brand: DOVER

## (undated) DEVICE — CHEST/BREAST DRAPE: Brand: CONVERTORS

## (undated) DEVICE — DRAPE SHEET THREE QUARTER

## (undated) DEVICE — BOWL: 16OZ PEELPOUCH 75/CS 16/PLT: Brand: MEDEGEN MEDICAL PRODUCTS, LLC

## (undated) DEVICE — GLOVE SRG BIOGEL 7.5

## (undated) DEVICE — CHLORAPREP HI-LITE 26ML ORANGE

## (undated) DEVICE — SUT MONOCRYL 3-0 PS-2 27 IN Y427H

## (undated) DEVICE — 2000CC GUARDIAN II: Brand: GUARDIAN

## (undated) DEVICE — INSULATED BLADE ELECTRODE: Brand: EDGE

## (undated) DEVICE — GLOVE INDICATOR PI UNDERGLOVE SZ 6.5 BLUE

## (undated) DEVICE — ADHESIVE SKN CLSR HISTOACRYL FLEX 0.5ML LF

## (undated) DEVICE — SUT VICRYL 3-0 RB-1 27 IN J215H